# Patient Record
Sex: FEMALE | Race: WHITE | NOT HISPANIC OR LATINO | URBAN - METROPOLITAN AREA
[De-identification: names, ages, dates, MRNs, and addresses within clinical notes are randomized per-mention and may not be internally consistent; named-entity substitution may affect disease eponyms.]

---

## 2018-06-06 ENCOUNTER — HISTORICAL (OUTPATIENT)
Dept: RADIOLOGY | Facility: HOSPITAL | Age: 43
End: 2018-06-06

## 2023-11-07 DIAGNOSIS — D72.829 LEUKOCYTOSIS: Primary | ICD-10-CM

## 2023-12-12 ENCOUNTER — OFFICE VISIT (OUTPATIENT)
Dept: HEMATOLOGY/ONCOLOGY | Facility: CLINIC | Age: 48
End: 2023-12-12
Payer: MEDICARE

## 2023-12-12 VITALS
RESPIRATION RATE: 20 BRPM | SYSTOLIC BLOOD PRESSURE: 109 MMHG | HEIGHT: 65 IN | BODY MASS INDEX: 35.82 KG/M2 | TEMPERATURE: 98 F | OXYGEN SATURATION: 99 % | WEIGHT: 215 LBS | HEART RATE: 104 BPM | DIASTOLIC BLOOD PRESSURE: 76 MMHG

## 2023-12-12 DIAGNOSIS — D72.823 LEUKEMOID REACTION: ICD-10-CM

## 2023-12-12 DIAGNOSIS — D50.0 ANEMIA DUE TO CHRONIC BLOOD LOSS: Primary | ICD-10-CM

## 2023-12-12 DIAGNOSIS — D50.0 IRON DEFICIENCY ANEMIA DUE TO CHRONIC BLOOD LOSS: ICD-10-CM

## 2023-12-12 PROBLEM — D50.9 IRON DEFICIENCY ANEMIA: Status: ACTIVE | Noted: 2023-12-12

## 2023-12-12 PROBLEM — D72.829 LEUKOCYTOSIS: Status: ACTIVE | Noted: 2023-12-12

## 2023-12-12 PROCEDURE — 99205 PR OFFICE/OUTPT VISIT, NEW, LEVL V, 60-74 MIN: ICD-10-PCS | Mod: ,,, | Performed by: NURSE PRACTITIONER

## 2023-12-12 PROCEDURE — 99205 OFFICE O/P NEW HI 60 MIN: CPT | Mod: ,,, | Performed by: NURSE PRACTITIONER

## 2023-12-12 RX ORDER — OXCARBAZEPINE 150 MG/1
150 TABLET, FILM COATED ORAL DAILY
COMMUNITY

## 2023-12-12 RX ORDER — METOPROLOL SUCCINATE 25 MG/1
25 TABLET, EXTENDED RELEASE ORAL DAILY
COMMUNITY

## 2023-12-12 RX ORDER — FERROUS SULFATE 325(65) MG
325 TABLET ORAL DAILY
COMMUNITY

## 2023-12-12 RX ORDER — LANOLIN ALCOHOL/MO/W.PET/CERES
1 CREAM (GRAM) TOPICAL 2 TIMES DAILY
COMMUNITY

## 2023-12-12 RX ORDER — QUETIAPINE FUMARATE 150 MG/1
150 TABLET, FILM COATED ORAL DAILY
COMMUNITY

## 2023-12-12 RX ORDER — DOCUSATE SODIUM 100 MG/1
100 CAPSULE, LIQUID FILLED ORAL 2 TIMES DAILY PRN
COMMUNITY

## 2023-12-12 RX ORDER — DULOXETIN HYDROCHLORIDE 60 MG/1
60 CAPSULE, DELAYED RELEASE ORAL 2 TIMES DAILY
COMMUNITY

## 2023-12-12 RX ORDER — POLYETHYLENE GLYCOL 3350 17 G/17G
17 POWDER, FOR SOLUTION ORAL
COMMUNITY

## 2023-12-12 RX ORDER — LEVONORGESTREL AND ETHINYL ESTRADIOL 6-5-10
1 KIT ORAL DAILY
COMMUNITY
Start: 2023-09-28

## 2023-12-12 RX ORDER — ATORVASTATIN CALCIUM 20 MG/1
20 TABLET, FILM COATED ORAL DAILY
COMMUNITY

## 2023-12-12 RX ORDER — IBUPROFEN 200 MG
600 TABLET ORAL EVERY 6 HOURS PRN
COMMUNITY

## 2023-12-12 NOTE — PROGRESS NOTES
Referring provider:    Skye Rowe.    Reason for consultation:    Leukocytosis.    Diagnosis:    Anemia with chronic blood loss from menorrhagia  Iron deficiency anemia    HPI:    Ms. Rios is a 48-year-old female who is referred to our office for evaluation of leukocytosis.  She has a history of anemia, currently on Ferrous Sulfate 325 mg PO QD, menorrhagia, depression, Bipolar disorder, cognitive deficits, low back pain, hyperlipidemia,  smokes 4 cigarettes a day, and morbid obesity.  She presented to her PCP for follow-up after her hospital admission on 10/7/2023 for weakness and vaginal bleeding x 3 weeks.  She was given 3 units of PRBCs and was seen by Dr. Gaucher GYN during her hospital admission.  Pelvic US on 10/8/2023 revealed:  Significant amount of complex fluid likely blood with debris distending the endometrial cavity.  Evaluation of CBCs: and CMPs:  2023:  WBC 12.1, Hgb 9.5, Hct 30.7, MCV 92, RDW 15.7, Plt 399,000, ANC 8.3 and CMP WNL.   CBC on 10/10/2023:  WBC 12.9, Hgb 11.3, hct 37.4, MCV 94, MCHC 30.2, RDW 16.3, Plts 455,000, ANC 9.8, CMP WNL.   CBC 2023:  CBC 10/7/2022:  WBC 7.3, Hgb 11.6, hct 37.9, MCV 91, MCHC 30.6, RDW 15.6, Plts 325,000, ANC 4.4, CMP WNL.    Family history:  Father -  from lung cancer.    Interval history:    2023:  Ms. Rios is here today for her consultation regarding leukocytosis.  She reports feeling well today.  She denies any fever, chills, drenching night sweats, or weight loss.  She denies any cough, SOB, chest pain, confirms severe menorrhagia, denies any epistaxis, hemoptysis, hematochezia, melena, hematuria, no abdominal pain, confirms constipation, no change in bladder habits, no joint/bone/muscle or leg cramping, no lower extremity pain or swelling, and no neurological changes.  Denies any recent infections, autoimmune disorders, splenectomy, family history of elevated WBC or anemia.  She confirms history of anemia and takes  Ferrous Sulfate 325 mg PO QD without nausea and bloating.  She is scheduled to see Dr. Alicea, GYN in February 2024 for menorrhagia.  She has never had a colonoscopy.  She reports eating and drinking well.  No recent Covid-19, infections, or illnesses.      Social History     Socioeconomic History    Marital status: Single   Tobacco Use    Smoking status: Every Day     Current packs/day: 0.25     Types: Cigarettes    Smokeless tobacco: Never   Substance and Sexual Activity    Alcohol use: Never    Drug use: Never    Sexual activity: Yes       Current Outpatient Medications   Medication Sig Dispense Refill    atorvastatin (LIPITOR) 20 MG tablet Take 20 mg by mouth once daily.      calcium citrate-vitamin D3 315-200 mg (CITRACAL+D) 315 mg-5 mcg (200 unit) per tablet Take 1 tablet by mouth 2 (two) times daily.      docusate sodium (COLACE) 100 MG capsule Take 100 mg by mouth 2 (two) times daily as needed.      DULoxetine (CYMBALTA) 60 MG capsule Take 60 mg by mouth 2 (two) times daily.      ferrous sulfate (FEOSOL) 325 mg (65 mg iron) Tab tablet Take 325 mg by mouth once daily.      ibuprofen (ADVIL,MOTRIN) 200 MG tablet Take 600 mg by mouth every 6 (six) hours as needed for Pain.      methylcellulose, laxative, 500 mg Tab Take 1 tablet by mouth Daily.      metoprolol succinate (TOPROL-XL) 25 MG 24 hr tablet Take 25 mg by mouth once daily.      multivitamin-iron-folic acid Tab Take 1 tablet by mouth Daily.      polyethylene glycol (GLYCOLAX) 17 gram PwPk Take 17 g by mouth as needed.      QUEtiapine 150 mg Tab Take 150 mg by mouth Daily.      TRILEPTAL 150 mg Tab Take 150 mg by mouth once daily.      TRIVORA, 28, 50-30 (6)/75-40 (5)/125-30(10) per tablet Take 1 tablet by mouth once daily.       No current facility-administered medications for this visit.       Review of patient's allergies indicates:   Allergen Reactions    Lortab [hydrocodone-acetaminophen] Nausea Only         Review of systems  CONSTITUTIONAL: no  fevers, no chills, no weight loss, no fatigue, no weakness  HEMATOLOGIC: no abnormal bleeding, no abnormal bruising, no drenching night sweats  ONCOLOGIC: no new masses or lumps  HEENT: no vision loss, no tinnitus or hearing loss, no nose bleeding, no dysphagia, no odynophagia  CVS: no chest pain, no palpitations, no dyspnea on exertion  RESP: no shortness of breath, no hemoptysis, no cough  GI: no nausea, no vomiting, no diarrhea, + constipation, no melena, no hematochezia, no hematemesis, no abdominal pain, no increase in abdominal girth  : no dysuria, no hematuria, no hesitancy, no discharge  INTEGUMENT: no rashes, no abnormal bruising, no nail pitting, no hyperpigmentation  NEURO: no falls, no memory loss, no paresthesias or dysesthesias, no urofecal incontinence or retention, no loss of strength on any extremity  MSK: no back pain, no new joint pain, no joint swelling  PSYCH: no suicidal or homicidal ideation, no depression, no insomnia, no anhedonia  ENDOCRINE: no heat or cold intolerance, no polyuria, no polydipsia  GYN:  Menorrhagia    Physical Exam:  Vitals:    12/12/23 1026   BP: 109/76   Pulse: 104   Resp: 20   Temp: 98.1 °F (36.7 °C)       ECOG PS 0  GA: AAOx3, NAD  HEENT: NCAT, PERRLA, EOMI, good dentition, moist oral mucous membranes  LYMPH: no cervical, axillary or supraclavicular adenopathy  CVS: s1s2 RRR, no M/R/G  RESP: CTA b/l, no crackles, no wheezes or rhonchi  ABD: soft, NT, ND, BS+, no hepatosplenomegaly  EXT: no deformities, no pedal edema  SKIN: no rashes, warm and dry  NEURO: normal mentation, strength 5/5 on all 4 extremities, no sensory deficits        Assessment  12/12/2023:  Patient with history of severe menorrhagia and iron deficiency anemia.  Recently hospitalized for weakness and 3 weeks of heavy menstrual bleeding.  Pelvic US positive for blood and debris in the endometrial cavity.  Takes daily iron without side effects.  Has an appointment with Dr. Alicea, GYN in February.  WBC  normal over the last several years.  She is probably experiencing reactive leukocytosis from heavy menstrual bleeding.    We discussed sending her for labs today to include CBC with differential, CMP, and iron panel based on the history of severe menorrhagia and recent hospitalization for weakness and anemia.  We discussed possibly moving up her GYN appointment with Dr. Alicea from February to December or January.  We discussed continuing with oral iron daily on empty stomach with a source of Vitamin C, and based on iron panel today, we may need to replete her iron with IV iron infusion.         Plan   Will send for labs today:  CBC with diff, CMP, and iron panel.  Will call Dr. Alicea's, GYN, office for possible earlier appointment.  Will see her back in 3 weeks to follow up with lab results and GYN appointment.  Continue with Ferrous Sulfate 325 mg PO QD on empty stomach with a good source of Vitamin C.      A total of  60 minutes were spent in review of records, interpretation of test, coordination of care, discussion and counseling with the patient.

## 2024-03-19 ENCOUNTER — OFFICE VISIT (OUTPATIENT)
Dept: HEMATOLOGY/ONCOLOGY | Facility: CLINIC | Age: 49
End: 2024-03-19
Payer: MEDICARE

## 2024-03-19 VITALS
RESPIRATION RATE: 18 BRPM | DIASTOLIC BLOOD PRESSURE: 88 MMHG | SYSTOLIC BLOOD PRESSURE: 132 MMHG | TEMPERATURE: 98 F | HEART RATE: 99 BPM | OXYGEN SATURATION: 98 % | HEIGHT: 65 IN | BODY MASS INDEX: 36.55 KG/M2 | WEIGHT: 219.38 LBS

## 2024-03-19 DIAGNOSIS — D72.828 OTHER ELEVATED WHITE BLOOD CELL (WBC) COUNT: ICD-10-CM

## 2024-03-19 DIAGNOSIS — D50.0 ANEMIA DUE TO CHRONIC BLOOD LOSS: Primary | ICD-10-CM

## 2024-03-19 DIAGNOSIS — D50.0 IRON DEFICIENCY ANEMIA DUE TO CHRONIC BLOOD LOSS: ICD-10-CM

## 2024-03-19 PROCEDURE — 99214 OFFICE O/P EST MOD 30 MIN: CPT | Mod: ,,, | Performed by: NURSE PRACTITIONER

## 2024-03-19 NOTE — PROGRESS NOTES
Referring provider:    Skye Rowe.    Reason for consultation:    Leukocytosis.    Diagnosis:    Anemia with chronic blood loss from menorrhagia  Iron deficiency anemia    HPI:    Ms. Rios is a 48-year-old female who is referred to our office for evaluation of leukocytosis.  She has a history of anemia, currently on Ferrous Sulfate 325 mg PO QD, menorrhagia, depression, Bipolar disorder, cognitive deficits, low back pain, hyperlipidemia,  smokes 4 cigarettes a day, and morbid obesity.  She presented to her PCP for follow-up after her hospital admission on 10/7/2023 for weakness and vaginal bleeding x 3 weeks.  She was given 3 units of PRBCs and was seen by Dr. Gaucher GYN during her hospital admission.  Pelvic US on 10/8/2023 revealed:  Significant amount of complex fluid likely blood with debris distending the endometrial cavity.  Evaluation of CBCs: and CMPs:  11/1/2023:  WBC 12.1, Hgb 9.5, Hct 30.7, MCV 92, RDW 15.7, Plt 399,000, ANC 8.3 and CMP WNL.   CBC on 10/10/2023:  WBC 12.9, Hgb 11.3, hct 37.4, MCV 94, MCHC 30.2, RDW 16.3, Plts 455,000, ANC 9.8, CMP WNL.   CBC 05/06/2023:  CBC 10/7/2022:  WBC 7.3, Hgb 11.6, hct 37.9, MCV 91, MCHC 30.6, RDW 15.6, Plts 325,000, ANC 4.4, CMP WNL.    12/12/2023:  Ms. Rios is here today for her consultation regarding leukocytosis.  She reports feeling well today.  She denies any fever, chills, drenching night sweats, or weight loss.  She denies any cough, SOB, chest pain, confirms severe menorrhagia, denies any epistaxis, hemoptysis, hematochezia, melena, hematuria, no abdominal pain, confirms constipation, no change in bladder habits, no joint/bone/muscle or leg cramping, no lower extremity pain or swelling, and no neurological changes.  Denies any recent infections, autoimmune disorders, splenectomy, family history of elevated WBC or anemia.  She confirms history of anemia and takes Ferrous Sulfate 325 mg PO QD without nausea and bloating.  She is scheduled to see  Dr. Alicea, GYN in 2024 for menorrhagia.  She has never had a colonoscopy.  She reports eating and drinking well.  No recent Covid-19, infections, or illnesses.    Interval history:    3/19/2024:  Ms. Rios is here today for discussion of lab results from her initial consultation and follow-up CBC regarding leukocytosis and iron deficiency anemia.  She reports feeling well today.  She denies any fever, chills, drenching night sweats, or weight loss.  She confirms dizziness and fatigue, but denies any cough, SOB, chest pain, confirms severe menorrhagia, denies any epistaxis, hemoptysis, hematochezia, melena, hematuria, no abdominal pain, confirms constipation, no change in bladder habits, no joint/bone/muscle or leg cramping, no lower extremity pain or swelling, and no neurological changes.  She continues Ferrous Sulfate 325 mg PO QD.  She is scheduled to see Dr. Alicea, GYN 2024 for menorrhagia.  Labs dated 2023:  Creatinine 0.87,  Liver enzymes WNL, T. Bili WNL, iron saturation 6%, ferritin 27, WBC 11.54, Hgb 9.9, Nct 33.2, and Plt 489,000, Retic 3.80.  Labs dated 03/15/2024:  Creatinine 0.68,  liver enzymes WNL, T. Bili WNL, iron saturation 8, ferritin 26, WBC 9.28, Hgb 11.2, Hct 35.6, Plt 435,000, Retic 1.80.  She reports eating and drinking well.  No recent Covid-19, infections, or illnesses.    Family history:  Father -  from lung cancer.      Social History     Socioeconomic History    Marital status: Single   Tobacco Use    Smoking status: Every Day     Current packs/day: 0.25     Types: Cigarettes    Smokeless tobacco: Never   Substance and Sexual Activity    Alcohol use: Never    Drug use: Never    Sexual activity: Yes       Current Outpatient Medications   Medication Sig Dispense Refill    atorvastatin (LIPITOR) 20 MG tablet Take 20 mg by mouth once daily.      calcium citrate-vitamin D3 315-200 mg (CITRACAL+D) 315 mg-5 mcg (200 unit) per tablet Take 1 tablet by mouth 2 (two)  times daily.      docusate sodium (COLACE) 100 MG capsule Take 100 mg by mouth 2 (two) times daily as needed.      DULoxetine (CYMBALTA) 60 MG capsule Take 60 mg by mouth 2 (two) times daily.      ferrous sulfate (FEOSOL) 325 mg (65 mg iron) Tab tablet Take 325 mg by mouth once daily.      metoprolol succinate (TOPROL-XL) 25 MG 24 hr tablet Take 25 mg by mouth once daily.      multivitamin-iron-folic acid Tab Take 1 tablet by mouth Daily.      polyethylene glycol (GLYCOLAX) 17 gram PwPk Take 17 g by mouth as needed.      QUEtiapine 150 mg Tab Take 150 mg by mouth Daily.      TRILEPTAL 150 mg Tab Take 150 mg by mouth once daily.      TRIVORA, 28, 50-30 (6)/75-40 (5)/125-30(10) per tablet Take 1 tablet by mouth once daily.      ibuprofen (ADVIL,MOTRIN) 200 MG tablet Take 600 mg by mouth every 6 (six) hours as needed for Pain.      methylcellulose, laxative, 500 mg Tab Take 1 tablet by mouth Daily.       No current facility-administered medications for this visit.       Review of patient's allergies indicates:   Allergen Reactions    Lortab [hydrocodone-acetaminophen] Nausea Only     Labs:  Labs dated 03/15/2024:  Creatinine 0.68,  liver enzymes WNL, T. Bili WNL, iron saturation 8, ferritin 26, WBC 9.28, Hgb 11.2, Hct 35.6, Plt 435,000, Retic 1.80.  12/12/2023:  Creatinine 0.87,  Liver enzymes WNL, T. Bili WNL, iron saturation 6%, ferritin 27, WBC 11.54, Hgb 9.9, Nct 33.2, and Plt 489,000, Retic 3.80.     Review of systems  CONSTITUTIONAL: no fevers, no chills, no weight loss, no fatigue, no weakness  HEMATOLOGIC: no abnormal bleeding, no abnormal bruising, no drenching night sweats  ONCOLOGIC: no new masses or lumps  HEENT: no vision loss, no tinnitus or hearing loss, no nose bleeding, no dysphagia, no odynophagia  CVS: no chest pain, no palpitations, no dyspnea on exertion  RESP: no shortness of breath, no hemoptysis, no cough  GI: no nausea, no vomiting, no diarrhea, + constipation, no melena, no hematochezia, no  hematemesis, no abdominal pain, no increase in abdominal girth  : no dysuria, no hematuria, no hesitancy, no discharge  INTEGUMENT: no rashes, no abnormal bruising, no nail pitting, no hyperpigmentation  NEURO: no falls, no memory loss, no paresthesias or dysesthesias, no urofecal incontinence or retention, no loss of strength on any extremity  MSK: no back pain, no new joint pain, no joint swelling  PSYCH: no suicidal or homicidal ideation, no depression, no insomnia, no anhedonia  ENDOCRINE: no heat or cold intolerance, no polyuria, no polydipsia  GYN:  Menorrhagia    Physical Exam:  Vitals:    03/19/24 1424   BP: 132/88   Pulse: 99   Resp: 18   Temp: 98.2 °F (36.8 °C)         ECOG PS 0  GA: AAOx3, NAD  HEENT: NCAT, PERRLA, EOMI, good dentition, moist oral mucous membranes  LYMPH: no cervical, axillary or supraclavicular adenopathy  CVS: s1s2 RRR, no M/R/G  RESP: CTA b/l, no crackles, no wheezes or rhonchi  ABD: soft, NT, ND, BS+, no hepatosplenomegaly  EXT: no deformities, no pedal edema  SKIN: no rashes, warm and dry  NEURO: normal mentation, strength 5/5 on all 4 extremities, no sensory deficits        Assessment    Iron deficiency anemia secondary to menorrhagia D 50.0    Patient with history of severe menorrhagia and iron deficiency anemia.  Recently hospitalized for weakness and 3 weeks of heavy menstrual bleeding.  Pelvic US positive for blood and debris in the endometrial cavity.  Takes daily iron without side effects.  Has an appointment with Dr. Alicea, GYN in February.  WBC normal over the last several years.  We discussed sending her for labs today to include CBC with differential, CMP, and iron panel based on the history of severe menorrhagia and recent hospitalization for weakness and anemia.  We discussed possibly moving up her GYN appointment with Dr. Alicea from February to December or January.  We discussed continuing with oral iron daily on empty stomach with a source of Vitamin C, and based on  iron panel today, we may need to replete her iron with IV iron infusion.   12/12/2023:  Iron saturation 6%, ferritin 27, WBC 11.54, Hgb 9.9, Hct 33.2, Retic 3.80.  Labs dated 03/15/2024:  Iron saturation 8, ferritin 26, WBC 9.28, Hgb 11.2, Hct 35.6, Retic 1.80    Leukocytosis reactive D72.828    She is probably experiencing reactive leukocytosis from heavy menstrual bleeding.  Resolved 3/15/2024.           Plan   Venofer IV infusion ordered once weekly x 5 weeks.  Dr. Alicea GYN appointment, March 27,2024 for menorrhagia management.  Will see her back in 12 weeks with CMP, CBC, and iron panel.  Will see Dr. Malhotra at this visit.  Continue with Ferrous Sulfate 325 mg PO QD on empty stomach with a good source of Vitamin C.    The patient is in agreement with today's plan of care.  All questions answered.  Patient is aware to contact our office for any problems or concerns prior to next visit.      Shawnee Solo, MSN-ED, APRN, AGACNP-BC, OCN

## 2024-06-12 ENCOUNTER — OFFICE VISIT (OUTPATIENT)
Dept: HEMATOLOGY/ONCOLOGY | Facility: CLINIC | Age: 49
End: 2024-06-12
Payer: MEDICARE

## 2024-06-12 VITALS
RESPIRATION RATE: 18 BRPM | HEIGHT: 65 IN | SYSTOLIC BLOOD PRESSURE: 124 MMHG | HEART RATE: 103 BPM | OXYGEN SATURATION: 98 % | WEIGHT: 230.5 LBS | BODY MASS INDEX: 38.4 KG/M2 | DIASTOLIC BLOOD PRESSURE: 86 MMHG | TEMPERATURE: 98 F

## 2024-06-12 DIAGNOSIS — D50.0 IRON DEFICIENCY ANEMIA DUE TO CHRONIC BLOOD LOSS: Primary | ICD-10-CM

## 2024-06-12 PROCEDURE — 99214 OFFICE O/P EST MOD 30 MIN: CPT | Mod: ,,,

## 2024-06-12 NOTE — PROGRESS NOTES
Referring provider:    Skye Rowe.    Reason for consultation:    Leukocytosis.    Diagnosis:    Anemia with chronic blood loss from menorrhagia  Iron deficiency anemia    HPI:    Ms. Rios is a 48-year-old female who is referred to our office for evaluation of leukocytosis.  She has a history of anemia, currently on Ferrous Sulfate 325 mg PO QD, menorrhagia, depression, Bipolar disorder, cognitive deficits, low back pain, hyperlipidemia,  smokes 4 cigarettes a day, and morbid obesity.  She presented to her PCP for follow-up after her hospital admission on 10/7/2023 for weakness and vaginal bleeding x 3 weeks.  She was given 3 units of PRBCs and was seen by Dr. Gaucher GYN during her hospital admission.  Pelvic US on 10/8/2023 revealed:  Significant amount of complex fluid likely blood with debris distending the endometrial cavity.  Evaluation of CBCs: and CMPs:  11/1/2023:  WBC 12.1, Hgb 9.5, Hct 30.7, MCV 92, RDW 15.7, Plt 399,000, ANC 8.3 and CMP WNL.   CBC on 10/10/2023:  WBC 12.9, Hgb 11.3, hct 37.4, MCV 94, MCHC 30.2, RDW 16.3, Plts 455,000, ANC 9.8, CMP WNL.   CBC 05/06/2023:  CBC 10/7/2022:  WBC 7.3, Hgb 11.6, hct 37.9, MCV 91, MCHC 30.6, RDW 15.6, Plts 325,000, ANC 4.4, CMP WNL.    12/12/2023:  Ms. Rios is here today for her consultation regarding leukocytosis.  She reports feeling well today.  She denies any fever, chills, drenching night sweats, or weight loss.  She denies any cough, SOB, chest pain, confirms severe menorrhagia, denies any epistaxis, hemoptysis, hematochezia, melena, hematuria, no abdominal pain, confirms constipation, no change in bladder habits, no joint/bone/muscle or leg cramping, no lower extremity pain or swelling, and no neurological changes.  Denies any recent infections, autoimmune disorders, splenectomy, family history of elevated WBC or anemia.  She confirms history of anemia and takes Ferrous Sulfate 325 mg PO QD without nausea and bloating.  She is scheduled to see  Dr. Alicea, GYN in 2024 for menorrhagia.  She has never had a colonoscopy.  She reports eating and drinking well.  No recent Covid-19, infections, or illnesses.    Interval history:    :  Ms. Rios is here today for discussion of lab results regarding leukocytosis and iron deficiency anemia.  She reports feeling well today.  She denies any fever, chills, drenching night sweats, or weight loss.  She confirms dizziness and fatigue, but denies any cough, SOB, chest pain, confirms severe menorrhagia, denies any epistaxis, hemoptysis, hematochezia, melena, hematuria, no abdominal pain, confirms constipation, no change in bladder habits, no joint/bone/muscle or leg cramping, no lower extremity pain or swelling, and no neurological changes.  She continues Ferrous Sulfate 325 mg PO QD.  She continues to follow-up with see Dr. Alieca, GYN for menorrhagia.  She reports eating and drinking well.      Family history:  Father -  from lung cancer.      Social History     Socioeconomic History    Marital status: Single   Tobacco Use    Smoking status: Every Day     Current packs/day: 0.25     Types: Cigarettes    Smokeless tobacco: Never   Substance and Sexual Activity    Alcohol use: Never    Drug use: Never    Sexual activity: Yes       Current Outpatient Medications   Medication Sig Dispense Refill    atorvastatin (LIPITOR) 20 MG tablet Take 20 mg by mouth once daily.      calcium citrate-vitamin D3 315-200 mg (CITRACAL+D) 315 mg-5 mcg (200 unit) per tablet Take 1 tablet by mouth 2 (two) times daily.      docusate sodium (COLACE) 100 MG capsule Take 100 mg by mouth 2 (two) times daily as needed.      DULoxetine (CYMBALTA) 60 MG capsule Take 60 mg by mouth 2 (two) times daily.      ferrous sulfate (FEOSOL) 325 mg (65 mg iron) Tab tablet Take 325 mg by mouth once daily.      ibuprofen (ADVIL,MOTRIN) 200 MG tablet Take 600 mg by mouth every 6 (six) hours as needed for Pain.      methylcellulose,  laxative, 500 mg Tab Take 1 tablet by mouth Daily.      metoprolol succinate (TOPROL-XL) 25 MG 24 hr tablet Take 25 mg by mouth once daily.      multivitamin-iron-folic acid Tab Take 1 tablet by mouth Daily.      polyethylene glycol (GLYCOLAX) 17 gram PwPk Take 17 g by mouth as needed.      QUEtiapine 150 mg Tab Take 150 mg by mouth Daily.      TRILEPTAL 150 mg Tab Take 150 mg by mouth once daily.      TRIVORA, 28, 50-30 (6)/75-40 (5)/125-30(10) per tablet Take 1 tablet by mouth once daily.       No current facility-administered medications for this visit.       Review of patient's allergies indicates:   Allergen Reactions    Lortab [hydrocodone-acetaminophen] Nausea Only     Labs:  06/03/24: cr 0.81, iron 60, iron sat 19%, ferritin 159, wbc 9.49, hgb 11.9, plt 388, ANC 6.91  03/15/2024:  Creatinine 0.68,  liver enzymes WNL, T. Bili WNL, iron saturation 8, ferritin 26, WBC 9.28, Hgb 11.2, Hct 35.6, Plt 435,000, Retic 1.80.  12/12/2023:  Creatinine 0.87,  Liver enzymes WNL, T. Bili WNL, iron saturation 6%, ferritin 27, WBC 11.54, Hgb 9.9, Nct 33.2, and Plt 489,000, Retic 3.80.     Review of systems  CONSTITUTIONAL: no fevers, no chills, no weight loss, no fatigue, no weakness  HEMATOLOGIC: no abnormal bleeding, no abnormal bruising, no drenching night sweats  ONCOLOGIC: no new masses or lumps  HEENT: no vision loss, no tinnitus or hearing loss, no nose bleeding, no dysphagia, no odynophagia  CVS: no chest pain, no palpitations, no dyspnea on exertion  RESP: no shortness of breath, no hemoptysis, no cough  GI: no nausea, no vomiting, no diarrhea, + constipation, no melena, no hematochezia, no hematemesis, no abdominal pain, no increase in abdominal girth  : no dysuria, no hematuria, no hesitancy, no discharge  INTEGUMENT: no rashes, no abnormal bruising, no nail pitting, no hyperpigmentation  NEURO: no falls, no memory loss, no paresthesias or dysesthesias, no urofecal incontinence or retention, no loss of strength  on any extremity  MSK: no back pain, no new joint pain, no joint swelling  PSYCH: no suicidal or homicidal ideation, no depression, no insomnia, no anhedonia  ENDOCRINE: no heat or cold intolerance, no polyuria, no polydipsia  GYN:  Menorrhagia    Physical Exam:  Vitals:    06/12/24 1345   BP: 124/86   Pulse: 103   Resp: 18   Temp: 98.3 °F (36.8 °C)         ECOG PS 0  GA: AAOx3, NAD  HEENT: NCAT, PERRLA, EOMI, good dentition, moist oral mucous membranes  LYMPH: no cervical, axillary or supraclavicular adenopathy  CVS: s1s2 RRR, no M/R/G  RESP: CTA b/l, no crackles, no wheezes or rhonchi  ABD: soft, NT, ND, BS+, no hepatosplenomegaly  EXT: no deformities, no pedal edema  SKIN: no rashes, warm and dry  NEURO: normal mentation, strength 5/5 on all 4 extremities, no sensory deficits        Assessment    Iron deficiency anemia secondary to menorrhagia D 50.0    Patient with history of severe menorrhagia and iron deficiency anemia.  Recently hospitalized for weakness and 3 weeks of heavy menstrual bleeding.  Pelvic US positive for blood and debris in the endometrial cavity.  Takes daily iron without side effects.  Has an appointment with Dr. Alicea, GYN in February.  WBC normal over the last several years.  We discussed sending her for labs today to include CBC with differential, CMP, and iron panel based on the history of severe menorrhagia and recent hospitalization for weakness and anemia.  We discussed possibly moving up her GYN appointment with Dr. Alicea from February to December or January.  We discussed continuing with oral iron daily on empty stomach with a source of Vitamin C, and based on iron panel today, we may need to replete her iron with IV iron infusion.   12/12/2023:  Iron saturation 6%, ferritin 27, WBC 11.54, Hgb 9.9, Hct 33.2, Retic 3.80.  Labs dated 03/15/2024:  Iron saturation 8, ferritin 26, WBC 9.28, Hgb 11.2, Hct 35.6, Retic 1.80    Leukocytosis reactive D72.828    She is probably experiencing  reactive leukocytosis from heavy menstrual bleeding.  Resolved 3/15/2024.           Plan   Labs reviewed, patient s/p Venofer from 4/2024. Iron levels normal  Continue follow-up with Dr. Alicea GYN for menorrhagia management.  RTC 4 months CMP, CBC, and iron panel.    Continue with Ferrous Sulfate 325 mg PO QD on empty stomach with a good source of Vitamin C.    The patient is in agreement with today's plan of care.  All questions answered.  Patient is aware to contact our office for any problems or concerns prior to next visit.

## 2024-10-21 ENCOUNTER — OFFICE VISIT (OUTPATIENT)
Dept: HEMATOLOGY/ONCOLOGY | Facility: CLINIC | Age: 49
End: 2024-10-21
Payer: MEDICARE

## 2024-10-21 VITALS
OXYGEN SATURATION: 98 % | WEIGHT: 225.69 LBS | BODY MASS INDEX: 38.53 KG/M2 | RESPIRATION RATE: 20 BRPM | HEIGHT: 64 IN | SYSTOLIC BLOOD PRESSURE: 146 MMHG | TEMPERATURE: 98 F | DIASTOLIC BLOOD PRESSURE: 87 MMHG | HEART RATE: 107 BPM

## 2024-10-21 DIAGNOSIS — D50.0 IRON DEFICIENCY ANEMIA DUE TO CHRONIC BLOOD LOSS: Primary | ICD-10-CM

## 2024-10-21 DIAGNOSIS — D72.828 OTHER ELEVATED WHITE BLOOD CELL (WBC) COUNT: ICD-10-CM

## 2024-10-21 DIAGNOSIS — D72.823 LEUKEMOID REACTION: ICD-10-CM

## 2024-10-21 DIAGNOSIS — D50.0 ANEMIA DUE TO CHRONIC BLOOD LOSS: ICD-10-CM

## 2024-10-21 PROCEDURE — 99213 OFFICE O/P EST LOW 20 MIN: CPT | Mod: ,,,

## 2024-10-21 RX ORDER — FERROUS SULFATE 325(65) MG
325 TABLET ORAL DAILY
Qty: 30 TABLET | Refills: 4 | Status: SHIPPED | OUTPATIENT
Start: 2024-10-21

## 2024-10-21 NOTE — PROGRESS NOTES
Referring provider:    Skye Rowe.    Reason for consultation:    Leukocytosis.    Diagnosis:    Anemia with chronic blood loss from menorrhagia  Iron deficiency anemia    HPI:    Ms. Rios is a 48-year-old female who is referred to our office for evaluation of leukocytosis.  She has a history of anemia, currently on Ferrous Sulfate 325 mg PO QD, menorrhagia, depression, Bipolar disorder, cognitive deficits, low back pain, hyperlipidemia,  smokes 4 cigarettes a day, and morbid obesity.  She presented to her PCP for follow-up after her hospital admission on 10/7/2023 for weakness and vaginal bleeding x 3 weeks.  She was given 3 units of PRBCs and was seen by Dr. Gaucher GYN during her hospital admission.  Pelvic US on 10/8/2023 revealed:  Significant amount of complex fluid likely blood with debris distending the endometrial cavity.  Evaluation of CBCs: and CMPs:  11/1/2023:  WBC 12.1, Hgb 9.5, Hct 30.7, MCV 92, RDW 15.7, Plt 399,000, ANC 8.3 and CMP WNL.   CBC on 10/10/2023:  WBC 12.9, Hgb 11.3, hct 37.4, MCV 94, MCHC 30.2, RDW 16.3, Plts 455,000, ANC 9.8, CMP WNL.   CBC 05/06/2023:  CBC 10/7/2022:  WBC 7.3, Hgb 11.6, hct 37.9, MCV 91, MCHC 30.6, RDW 15.6, Plts 325,000, ANC 4.4, CMP WNL.    Interval history:    06/12/2024:  Ms. Rios is here today for discussion of lab results regarding leukocytosis and iron deficiency anemia.  She reports feeling well today.  She denies any fever, chills, drenching night sweats, or weight loss.  She confirms dizziness and fatigue, but denies any cough, SOB, chest pain, confirms severe menorrhagia, denies any epistaxis, hemoptysis, hematochezia, melena, hematuria, no abdominal pain, confirms constipation, no change in bladder habits, no joint/bone/muscle or leg cramping, no lower extremity pain or swelling, and no neurological changes.She has not been compliant with oral iron, will refill today  She continues to follow-up with see Dr. Alicea, GYN for menorrhagia.  She reports  eating and drinking well.      Family history:  Father -  from lung cancer.      Social History     Socioeconomic History    Marital status: Single   Tobacco Use    Smoking status: Every Day     Current packs/day: 0.25     Types: Cigarettes    Smokeless tobacco: Never   Substance and Sexual Activity    Alcohol use: Never    Drug use: Never    Sexual activity: Yes       Current Outpatient Medications   Medication Sig Dispense Refill    atorvastatin (LIPITOR) 20 MG tablet Take 20 mg by mouth once daily.      calcium citrate-vitamin D3 315-200 mg (CITRACAL+D) 315 mg-5 mcg (200 unit) per tablet Take 1 tablet by mouth 2 (two) times daily.      docusate sodium (COLACE) 100 MG capsule Take 100 mg by mouth 2 (two) times daily as needed.      DULoxetine (CYMBALTA) 60 MG capsule Take 60 mg by mouth 2 (two) times daily.      ibuprofen (ADVIL,MOTRIN) 200 MG tablet Take 600 mg by mouth every 6 (six) hours as needed for Pain.      methylcellulose, laxative, 500 mg Tab Take 1 tablet by mouth Daily.      metoprolol succinate (TOPROL-XL) 25 MG 24 hr tablet Take 25 mg by mouth once daily.      multivitamin-iron-folic acid Tab Take 1 tablet by mouth Daily.      polyethylene glycol (GLYCOLAX) 17 gram PwPk Take 17 g by mouth as needed.      QUEtiapine 150 mg Tab Take 150 mg by mouth Daily.      TRILEPTAL 150 mg Tab Take 150 mg by mouth once daily.      TRIVORA, 28, 50-30 (6)/75-40 (5)/125-30(10) per tablet Take 1 tablet by mouth once daily.      ferrous sulfate (FEOSOL) 325 mg (65 mg iron) Tab tablet Take 1 tablet (325 mg total) by mouth once daily. 30 tablet 4     No current facility-administered medications for this visit.       Review of patient's allergies indicates:   Allergen Reactions    Lortab [hydrocodone-acetaminophen] Nausea Only     Labs:  10/9/24: cr 0.74, iron 113, iron sat 30%, ferritin 34, wbc 10.99, hgb 11.2, plt 397, ANC 7.68  24: cr 0.81, iron 60, iron sat 19%, ferritin 159, wbc 9.49, hgb 11.9, plt 388,  ANC 6.91  03/15/2024:  Creatinine 0.68,  liver enzymes WNL, T. Bili WNL, iron saturation 8, ferritin 26, WBC 9.28, Hgb 11.2, Hct 35.6, Plt 435,000, Retic 1.80.  12/12/2023:  Creatinine 0.87,  Liver enzymes WNL, T. Bili WNL, iron saturation 6%, ferritin 27, WBC 11.54, Hgb 9.9, Nct 33.2, and Plt 489,000, Retic 3.80.     Review of systems  CONSTITUTIONAL: no fevers, no chills, no weight loss, no fatigue, no weakness  HEMATOLOGIC: no abnormal bleeding, no abnormal bruising, no drenching night sweats  ONCOLOGIC: no new masses or lumps  HEENT: no vision loss, no tinnitus or hearing loss, no nose bleeding, no dysphagia, no odynophagia  CVS: no chest pain, no palpitations, no dyspnea on exertion  RESP: no shortness of breath, no hemoptysis, no cough  GI: no nausea, no vomiting, no diarrhea, + constipation, no melena, no hematochezia, no hematemesis, no abdominal pain, no increase in abdominal girth  : no dysuria, no hematuria, no hesitancy, no discharge  INTEGUMENT: no rashes, no abnormal bruising, no nail pitting, no hyperpigmentation  NEURO: no falls, no memory loss, no paresthesias or dysesthesias, no urofecal incontinence or retention, no loss of strength on any extremity  MSK: no back pain, no new joint pain, no joint swelling  PSYCH: no suicidal or homicidal ideation, no depression, no insomnia, no anhedonia  ENDOCRINE: no heat or cold intolerance, no polyuria, no polydipsia  GYN:  Menorrhagia    Physical Exam:  Vitals:    10/21/24 1508   BP: (!) 146/87   Pulse: 107   Resp: 20   Temp: 98 °F (36.7 °C)           ECOG PS 0  GA: AAOx3, NAD  HEENT: NCAT, PERRLA, EOMI, good dentition, moist oral mucous membranes  LYMPH: no cervical, axillary or supraclavicular adenopathy  CVS: s1s2 RRR, no M/R/G  RESP: CTA b/l, no crackles, no wheezes or rhonchi  ABD: soft, NT, ND, BS+, no hepatosplenomegaly  EXT: no deformities, no pedal edema  SKIN: no rashes, warm and dry  NEURO: normal mentation, strength 5/5 on all 4 extremities, no  sensory deficits        Assessment    Iron deficiency anemia secondary to menorrhagia D 50.0    Patient with history of severe menorrhagia and iron deficiency anemia.  Recently hospitalized for weakness and 3 weeks of heavy menstrual bleeding.  Pelvic US positive for blood and debris in the endometrial cavity.  Takes daily iron without side effects.  Has an appointment with Dr. Alicea, GYN in February.  WBC normal over the last several years.  We discussed sending her for labs today to include CBC with differential, CMP, and iron panel based on the history of severe menorrhagia and recent hospitalization for weakness and anemia.  We discussed possibly moving up her GYN appointment with Dr. Alicea from February to December or January.  We discussed continuing with oral iron daily on empty stomach with a source of Vitamin C, and based on iron panel today, we may need to replete her iron with IV iron infusion.   12/12/2023:  Iron saturation 6%, ferritin 27, WBC 11.54, Hgb 9.9, Hct 33.2, Retic 3.80.  Labs dated 03/15/2024:  Iron saturation 8, ferritin 26, WBC 9.28, Hgb 11.2, Hct 35.6, Retic 1.80    Leukocytosis reactive D72.828    She is probably experiencing reactive leukocytosis from heavy menstrual bleeding.  Resolved 3/15/2024.     Plan   Labs reviewed,  Iron levels low normal  Continue follow-up with Dr. Alicea GYN for menorrhagia management.  RTC 2 months CMP, CBC, and iron panel.    Restart Ferrous Sulfate 325 mg PO QD on empty stomach with a good source of Vitamin C.    The patient is in agreement with today's plan of care.  All questions answered.  Patient is aware to contact our office for any problems or concerns prior to next visit.    Odalis CALDWELL-C

## 2025-01-03 NOTE — PROGRESS NOTES
Referring provider:    Skye Rowe.    Reason for consultation:    Leukocytosis.    Diagnosis:    Anemia with chronic blood loss from menorrhagia  Iron deficiency anemia    HPI:    Ms. Rios is a 48-year-old female who is referred to our office for evaluation of leukocytosis.  She has a history of anemia, currently on Ferrous Sulfate 325 mg PO QD, menorrhagia, depression, Bipolar disorder, cognitive deficits, low back pain, hyperlipidemia,  smokes 4 cigarettes a day, and morbid obesity.  She presented to her PCP for follow-up after her hospital admission on 10/7/2023 for weakness and vaginal bleeding x 3 weeks.  She was given 3 units of PRBCs and was seen by Dr. Gaucher GYN during her hospital admission.  Pelvic US on 10/8/2023 revealed:  Significant amount of complex fluid likely blood with debris distending the endometrial cavity.  Evaluation of CBCs: and CMPs:  11/1/2023:  WBC 12.1, Hgb 9.5, Hct 30.7, MCV 92, RDW 15.7, Plt 399,000, ANC 8.3 and CMP WNL.   CBC on 10/10/2023:  WBC 12.9, Hgb 11.3, hct 37.4, MCV 94, MCHC 30.2, RDW 16.3, Plts 455,000, ANC 9.8, CMP WNL.   CBC 05/06/2023:  CBC 10/7/2022:  WBC 7.3, Hgb 11.6, hct 37.9, MCV 91, MCHC 30.6, RDW 15.6, Plts 325,000, ANC 4.4, CMP WNL.    Interval history:    1/6/2025:  Ms. Rios is here today for discussion of lab results regarding leukocytosis and iron deficiency anemia.  She reports feeling well today.  She denies any fever, chills, drenching night sweats, or weight loss.  She confirms dizziness and fatigue, but denies any cough, SOB, chest pain, confirms severe menorrhagia, denies any epistaxis, hemoptysis, hematochezia, melena, hematuria, no abdominal pain, confirms constipation, no change in bladder habits, no joint/bone/muscle or leg cramping, no lower extremity pain or swelling, and no neurological changes. She has not been compliant with oral iron, will refill today  She continues to follow-up with see Dr. Alicea, GYN for menorrhagia.  She reports  eating and drinking well.      Family history:  Father -  from lung cancer.      Social History     Socioeconomic History    Marital status: Single   Tobacco Use    Smoking status: Every Day     Current packs/day: 0.25     Types: Cigarettes    Smokeless tobacco: Never   Substance and Sexual Activity    Alcohol use: Never    Drug use: Never    Sexual activity: Yes       Current Outpatient Medications   Medication Sig Dispense Refill    atorvastatin (LIPITOR) 20 MG tablet Take 20 mg by mouth once daily.      calcium citrate-vitamin D3 315-200 mg (CITRACAL+D) 315 mg-5 mcg (200 unit) per tablet Take 1 tablet by mouth 2 (two) times daily.      docusate sodium (COLACE) 100 MG capsule Take 100 mg by mouth 2 (two) times daily as needed.      DULoxetine (CYMBALTA) 60 MG capsule Take 60 mg by mouth 2 (two) times daily.      ferrous sulfate (FEOSOL) 325 mg (65 mg iron) Tab tablet Take 1 tablet (325 mg total) by mouth once daily. 30 tablet 4    ibuprofen (ADVIL,MOTRIN) 200 MG tablet Take 600 mg by mouth every 6 (six) hours as needed for Pain.      methylcellulose, laxative, 500 mg Tab Take 1 tablet by mouth Daily.      metoprolol succinate (TOPROL-XL) 25 MG 24 hr tablet Take 25 mg by mouth once daily.      multivitamin-iron-folic acid Tab Take 1 tablet by mouth Daily.      polyethylene glycol (GLYCOLAX) 17 gram PwPk Take 17 g by mouth as needed.      QUEtiapine 150 mg Tab Take 150 mg by mouth Daily.      TRILEPTAL 150 mg Tab Take 150 mg by mouth once daily.      TRIVORA, 28, 50-30 (6)/75-40 (5)/125-30(10) per tablet Take 1 tablet by mouth once daily.       No current facility-administered medications for this visit.       Review of patient's allergies indicates:   Allergen Reactions    Lortab [hydrocodone-acetaminophen] Nausea Only     Labs:  25: cr 0.74, iron 92, iron sat 24%, ferritin 24, wbc 9.12, hgb 10.5, plt 399, ANC 5.61    10/9/24: cr 0.74, iron 113, iron sat 30%, ferritin 34, wbc 10.99, hgb 11.2, plt 397,  ANC 7.68  06/03/24: cr 0.81, iron 60, iron sat 19%, ferritin 159, wbc 9.49, hgb 11.9, plt 388, ANC 6.91  03/15/2024:  Creatinine 0.68,  liver enzymes WNL, T. Bili WNL, iron saturation 8, ferritin 26, WBC 9.28, Hgb 11.2, Hct 35.6, Plt 435,000, Retic 1.80.  12/12/2023:  Creatinine 0.87,  Liver enzymes WNL, T. Bili WNL, iron saturation 6%, ferritin 27, WBC 11.54, Hgb 9.9, Nct 33.2, and Plt 489,000, Retic 3.80.     Review of systems  CONSTITUTIONAL: no fevers, no chills, no weight loss, no fatigue, no weakness  HEMATOLOGIC: no abnormal bleeding, no abnormal bruising, no drenching night sweats  ONCOLOGIC: no new masses or lumps  HEENT: no vision loss, no tinnitus or hearing loss, no nose bleeding, no dysphagia, no odynophagia  CVS: no chest pain, no palpitations, no dyspnea on exertion  RESP: no shortness of breath, no hemoptysis, no cough  GI: no nausea, no vomiting, no diarrhea, + constipation, no melena, no hematochezia, no hematemesis, no abdominal pain, no increase in abdominal girth  : no dysuria, no hematuria, no hesitancy, no discharge  INTEGUMENT: no rashes, no abnormal bruising, no nail pitting, no hyperpigmentation  NEURO: no falls, no memory loss, no paresthesias or dysesthesias, no urofecal incontinence or retention, no loss of strength on any extremity  MSK: no back pain, no new joint pain, no joint swelling  PSYCH: no suicidal or homicidal ideation, no depression, no insomnia, no anhedonia  ENDOCRINE: no heat or cold intolerance, no polyuria, no polydipsia  GYN:  Menorrhagia    Physical Exam:  There were no vitals filed for this visit.          ECOG PS 0  GA: AAOx3, NAD  HEENT: NCAT, PERRLA, EOMI, good dentition, moist oral mucous membranes  LYMPH: no cervical, axillary or supraclavicular adenopathy  CVS: s1s2 RRR, no M/R/G  RESP: CTA b/l, no crackles, no wheezes or rhonchi  ABD: soft, NT, ND, BS+, no hepatosplenomegaly  EXT: no deformities, no pedal edema  SKIN: no rashes, warm and dry  NEURO: normal  mentation, strength 5/5 on all 4 extremities, no sensory deficits        Assessment    Iron deficiency anemia secondary to menorrhagia D 50.0    Patient with history of severe menorrhagia and iron deficiency anemia.  Recently hospitalized for weakness and 3 weeks of heavy menstrual bleeding.  Pelvic US positive for blood and debris in the endometrial cavity.  Takes daily iron without side effects.  Has an appointment with Dr. Alicea, GYN in February.  WBC normal over the last several years.  We discussed sending her for labs today to include CBC with differential, CMP, and iron panel based on the history of severe menorrhagia and recent hospitalization for weakness and anemia.  We discussed possibly moving up her GYN appointment with Dr. Alicea from February to December or January.  We discussed continuing with oral iron daily on empty stomach with a source of Vitamin C, and based on iron panel today, we may need to replete her iron with IV iron infusion.   12/12/2023:  Iron saturation 6%, ferritin 27, WBC 11.54, Hgb 9.9, Hct 33.2, Retic 3.80.  Labs dated 03/15/2024:  Iron saturation 8, ferritin 26, WBC 9.28, Hgb 11.2, Hct 35.6, Retic 1.80    Leukocytosis reactive D72.828    She is probably experiencing reactive leukocytosis from heavy menstrual bleeding.  Resolved 3/15/2024.     Plan   Labs reviewed,  Iron and WBC levels WNL  Continue follow-up with Dr. Alicea GYN for menorrhagia management.  RTC 3 months CMP, CBC, and iron panel.    Continue Ferrous Sulfate 325 mg PO QD on empty stomach with a good source of Vitamin C.      Odalis Sanz FNP-C

## 2025-01-06 ENCOUNTER — OFFICE VISIT (OUTPATIENT)
Dept: HEMATOLOGY/ONCOLOGY | Facility: CLINIC | Age: 50
End: 2025-01-06
Payer: MEDICARE

## 2025-01-06 VITALS
WEIGHT: 228.81 LBS | DIASTOLIC BLOOD PRESSURE: 84 MMHG | RESPIRATION RATE: 14 BRPM | SYSTOLIC BLOOD PRESSURE: 119 MMHG | TEMPERATURE: 98 F | BODY MASS INDEX: 39.06 KG/M2 | HEART RATE: 111 BPM | HEIGHT: 64 IN | OXYGEN SATURATION: 99 %

## 2025-01-06 DIAGNOSIS — Z86.2 HISTORY OF LEUKOCYTOSIS: ICD-10-CM

## 2025-01-06 DIAGNOSIS — D50.0 IRON DEFICIENCY ANEMIA DUE TO CHRONIC BLOOD LOSS: Primary | ICD-10-CM

## 2025-01-06 PROCEDURE — 99214 OFFICE O/P EST MOD 30 MIN: CPT | Mod: ,,,

## 2025-04-10 NOTE — PROGRESS NOTES
Referring provider:    Skye Rowe.    Reason for consultation:    Leukocytosis and Iron deficiency due to menorrhagia    Diagnosis:    Anemia with chronic blood loss from menorrhagia  Iron deficiency anemia    HPI:    Ms. Rios is a 48-year-old female who is referred to our office for evaluation of leukocytosis.  She has a history of anemia, currently on Ferrous Sulfate 325 mg PO QD, menorrhagia, depression, Bipolar disorder, cognitive deficits, low back pain, hyperlipidemia,  smokes 4 cigarettes a day, and morbid obesity.  She presented to her PCP for follow-up after her hospital admission on 10/7/2023 for weakness and vaginal bleeding x 3 weeks.  She was given 3 units of PRBCs and was seen by Dr. Gaucher GYN during her hospital admission.  Pelvic US on 10/8/2023 revealed:  Significant amount of complex fluid likely blood with debris distending the endometrial cavity.  Evaluation of CBCs: and CMPs:  11/1/2023:  WBC 12.1, Hgb 9.5, Hct 30.7, MCV 92, RDW 15.7, Plt 399,000, ANC 8.3 and CMP WNL.   CBC on 10/10/2023:  WBC 12.9, Hgb 11.3, hct 37.4, MCV 94, MCHC 30.2, RDW 16.3, Plts 455,000, ANC 9.8, CMP WNL.   CBC 05/06/2023:  CBC 10/7/2022:  WBC 7.3, Hgb 11.6, hct 37.9, MCV 91, MCHC 30.6, RDW 15.6, Plts 325,000, ANC 4.4, CMP WNL.    Interval history:    4/14/25:  Ms. Rios is here today for discussion of lab results regarding leukocytosis and iron deficiency anemia.  She reports feeling well today.  She denies any fever, chills, drenching night sweats, or weight loss.  She confirms dizziness, fatigue, cough, SOB, chest pain, confirms severe menorrhagia, denies any epistaxis, hemoptysis, hematochezia, melena, hematuria, no abdominal pain, confirms constipation, no change in bladder habits, no joint/bone/muscle or leg cramping, no lower extremity pain or swelling, and no neurological changes. She has not been compliant with oral iron, will refill today  She continues to follow-up with GYN for menorrhagia.  She  reports eating and drinking well.      Family history:  Father -  from lung cancer.      Social History     Socioeconomic History    Marital status: Single   Tobacco Use    Smoking status: Every Day     Current packs/day: 0.25     Types: Cigarettes    Smokeless tobacco: Never   Substance and Sexual Activity    Alcohol use: Never    Drug use: Never    Sexual activity: Yes       Current Outpatient Medications   Medication Sig Dispense Refill    atorvastatin (LIPITOR) 20 MG tablet Take 20 mg by mouth once daily.      calcium citrate-vitamin D3 315-200 mg (CITRACAL+D) 315 mg-5 mcg (200 unit) per tablet Take 1 tablet by mouth 2 (two) times daily.      docusate sodium (COLACE) 100 MG capsule Take 100 mg by mouth 2 (two) times daily as needed.      DULoxetine (CYMBALTA) 60 MG capsule Take 60 mg by mouth 2 (two) times daily.      ferrous sulfate (FEOSOL) 325 mg (65 mg iron) Tab tablet Take 1 tablet (325 mg total) by mouth once daily. 30 tablet 4    ibuprofen (ADVIL,MOTRIN) 200 MG tablet Take 600 mg by mouth every 6 (six) hours as needed for Pain.      methylcellulose, laxative, 500 mg Tab Take 1 tablet by mouth Daily.      metoprolol succinate (TOPROL-XL) 25 MG 24 hr tablet Take 25 mg by mouth once daily.      multivitamin-iron-folic acid Tab Take 1 tablet by mouth Daily.      polyethylene glycol (GLYCOLAX) 17 gram PwPk Take 17 g by mouth as needed.      QUEtiapine 150 mg Tab Take 150 mg by mouth Daily.      TRILEPTAL 150 mg Tab Take 150 mg by mouth once daily.      TRIVORA, 28, 50-30 (6)/75-40 (5)/125-30(10) per tablet Take 1 tablet by mouth once daily.       No current facility-administered medications for this visit.       Review of patient's allergies indicates:   Allergen Reactions    Lortab [hydrocodone-acetaminophen] Nausea Only     Labs:  25: cr 0.81, iron 85, iron sat 18%, ferritin 38, wbc 10.37, hgb 12.3, plt 407, ANC 7.18    25: cr 0.74, iron 92, iron sat 24%, ferritin 24, wbc 9.12, hgb 10.5, plt  399, ANC 5.61    10/9/24: cr 0.74, iron 113, iron sat 30%, ferritin 34, wbc 10.99, hgb 11.2, plt 397, ANC 7.68  06/03/24: cr 0.81, iron 60, iron sat 19%, ferritin 159, wbc 9.49, hgb 11.9, plt 388, ANC 6.91  03/15/2024:  Creatinine 0.68,  liver enzymes WNL, T. Bili WNL, iron saturation 8, ferritin 26, WBC 9.28, Hgb 11.2, Hct 35.6, Plt 435,000, Retic 1.80.  12/12/2023:  Creatinine 0.87,  Liver enzymes WNL, T. Bili WNL, iron saturation 6%, ferritin 27, WBC 11.54, Hgb 9.9, Nct 33.2, and Plt 489,000, Retic 3.80.     Review of systems  CONSTITUTIONAL: no fevers, no chills, no weight loss, no fatigue, no weakness  HEMATOLOGIC: no abnormal bleeding, no abnormal bruising, no drenching night sweats  ONCOLOGIC: no new masses or lumps  HEENT: no vision loss, no tinnitus or hearing loss, no nose bleeding, no dysphagia, no odynophagia  CVS: no chest pain, no palpitations, no dyspnea on exertion  RESP: no shortness of breath, no hemoptysis, no cough  GI: no nausea, no vomiting, no diarrhea, + constipation, no melena, no hematochezia, no hematemesis, no abdominal pain, no increase in abdominal girth  : no dysuria, no hematuria, no hesitancy, no discharge  INTEGUMENT: no rashes, no abnormal bruising, no nail pitting, no hyperpigmentation  NEURO: no falls, no memory loss, no paresthesias or dysesthesias, no urofecal incontinence or retention, no loss of strength on any extremity  MSK: no back pain, no new joint pain, no joint swelling  PSYCH: no suicidal or homicidal ideation, no depression, no insomnia, no anhedonia  ENDOCRINE: no heat or cold intolerance, no polyuria, no polydipsia  GYN:  Menorrhagia    Physical Exam:  There were no vitals filed for this visit.          ECOG PS 0  GA: AAOx3, NAD  HEENT: NCAT, PERRLA, EOMI, good dentition, moist oral mucous membranes  LYMPH: no cervical, axillary or supraclavicular adenopathy  CVS: s1s2 RRR, no M/R/G  RESP: CTA b/l, no crackles, no wheezes or rhonchi  ABD: soft, NT, ND, BS+, no  hepatosplenomegaly  EXT: no deformities, no pedal edema  SKIN: no rashes, warm and dry  NEURO: normal mentation, strength 5/5 on all 4 extremities, no sensory deficits        Assessment    Iron deficiency anemia secondary to menorrhagia D 50.0    Patient with history of severe menorrhagia and iron deficiency anemia.  Recently hospitalized for weakness and 3 weeks of heavy menstrual bleeding.  Pelvic US positive for blood and debris in the endometrial cavity.  Takes daily iron without side effects.  Has an appointment with Dr. Alicea, GYN in February.  WBC normal over the last several years.  We discussed sending her for labs today to include CBC with differential, CMP, and iron panel based on the history of severe menorrhagia and recent hospitalization for weakness and anemia.  We discussed possibly moving up her GYN appointment with Dr. Alicea from February to December or January.  We discussed continuing with oral iron daily on empty stomach with a source of Vitamin C, and based on iron panel today, we may need to replete her iron with IV iron infusion.   12/12/2023:  Iron saturation 6%, ferritin 27, WBC 11.54, Hgb 9.9, Hct 33.2, Retic 3.80.  Labs dated 03/15/2024:  Iron saturation 8, ferritin 26, WBC 9.28, Hgb 11.2, Hct 35.6, Retic 1.80    Leukocytosis reactive D72.828    She is probably experiencing reactive leukocytosis from heavy menstrual bleeding.  Resolved 3/15/2024.     Plan   Labs reviewed,  Iron and WBC levels stable  Continue follow-up with GYN for menorrhagia management.  RTC 3 months CMP, CBC, and iron panel.    Continue Ferrous Sulfate 325 mg PO QD on empty stomach with a good source of Vitamin C.      Odalis Sanz FNP-C

## 2025-04-14 ENCOUNTER — OFFICE VISIT (OUTPATIENT)
Dept: HEMATOLOGY/ONCOLOGY | Facility: CLINIC | Age: 50
End: 2025-04-14
Payer: MEDICARE

## 2025-04-14 VITALS
BODY MASS INDEX: 38.81 KG/M2 | SYSTOLIC BLOOD PRESSURE: 126 MMHG | OXYGEN SATURATION: 96 % | HEIGHT: 64 IN | TEMPERATURE: 98 F | HEART RATE: 90 BPM | DIASTOLIC BLOOD PRESSURE: 92 MMHG | RESPIRATION RATE: 14 BRPM | WEIGHT: 227.31 LBS

## 2025-04-14 DIAGNOSIS — D50.0 IRON DEFICIENCY ANEMIA DUE TO CHRONIC BLOOD LOSS: Primary | ICD-10-CM

## 2025-04-14 DIAGNOSIS — D72.823 LEUKEMOID REACTION: ICD-10-CM

## 2025-04-14 PROCEDURE — 99214 OFFICE O/P EST MOD 30 MIN: CPT | Mod: ,,,

## 2025-07-24 ENCOUNTER — TELEPHONE (OUTPATIENT)
Dept: HEMATOLOGY/ONCOLOGY | Facility: CLINIC | Age: 50
End: 2025-07-24
Payer: MEDICARE

## 2025-07-24 NOTE — TELEPHONE ENCOUNTER
Called patient about labs and confirmed appointment for 7/28/25. Patient confirmed appointment and stated she may be able to get labs done today.

## 2025-07-28 ENCOUNTER — OFFICE VISIT (OUTPATIENT)
Dept: HEMATOLOGY/ONCOLOGY | Facility: CLINIC | Age: 50
End: 2025-07-28
Payer: MEDICARE

## 2025-07-28 VITALS
TEMPERATURE: 98 F | OXYGEN SATURATION: 96 % | HEIGHT: 64 IN | BODY MASS INDEX: 36.79 KG/M2 | DIASTOLIC BLOOD PRESSURE: 86 MMHG | WEIGHT: 215.5 LBS | SYSTOLIC BLOOD PRESSURE: 120 MMHG | RESPIRATION RATE: 12 BRPM | HEART RATE: 100 BPM

## 2025-07-28 DIAGNOSIS — D72.823 LEUKEMOID REACTION: ICD-10-CM

## 2025-07-28 DIAGNOSIS — D72.828 OTHER ELEVATED WHITE BLOOD CELL (WBC) COUNT: ICD-10-CM

## 2025-07-28 DIAGNOSIS — D50.0 IRON DEFICIENCY ANEMIA DUE TO CHRONIC BLOOD LOSS: Primary | ICD-10-CM

## 2025-07-28 DIAGNOSIS — Z86.2 HISTORY OF LEUKOCYTOSIS: ICD-10-CM

## 2025-07-28 DIAGNOSIS — D50.0 ANEMIA DUE TO CHRONIC BLOOD LOSS: ICD-10-CM

## 2025-07-28 PROCEDURE — 99214 OFFICE O/P EST MOD 30 MIN: CPT | Mod: ,,,

## 2025-07-28 RX ORDER — FERROUS SULFATE 325(65) MG
325 TABLET ORAL DAILY
Qty: 30 TABLET | Refills: 4 | Status: SHIPPED | OUTPATIENT
Start: 2025-07-28

## 2025-07-28 NOTE — PROGRESS NOTES
Referring provider:    Skye Rowe.    Reason for consultation:    Leukocytosis and Iron deficiency due to menorrhagia    Diagnosis:    Anemia with chronic blood loss from menorrhagia  Iron deficiency anemia    HPI:    Ms. Rios is a 48-year-old female who is referred to our office for evaluation of leukocytosis.  She has a history of anemia, currently on Ferrous Sulfate 325 mg PO QD, menorrhagia, depression, Bipolar disorder, cognitive deficits, low back pain, hyperlipidemia,  smokes 4 cigarettes a day, and morbid obesity.  She presented to her PCP for follow-up after her hospital admission on 10/7/2023 for weakness and vaginal bleeding x 3 weeks.  She was given 3 units of PRBCs and was seen by Dr. Gaucher GYN during her hospital admission.  Pelvic US on 10/8/2023 revealed:  Significant amount of complex fluid likely blood with debris distending the endometrial cavity.  Evaluation of CBCs: and CMPs:  11/1/2023:  WBC 12.1, Hgb 9.5, Hct 30.7, MCV 92, RDW 15.7, Plt 399,000, ANC 8.3 and CMP WNL.   CBC on 10/10/2023:  WBC 12.9, Hgb 11.3, hct 37.4, MCV 94, MCHC 30.2, RDW 16.3, Plts 455,000, ANC 9.8, CMP WNL.   CBC 05/06/2023:  CBC 10/7/2022:  WBC 7.3, Hgb 11.6, hct 37.9, MCV 91, MCHC 30.6, RDW 15.6, Plts 325,000, ANC 4.4, CMP WNL.    Interval history:    4/14/25:  Ms. Rios is here today for discussion of lab results regarding leukocytosis and iron deficiency anemia.  She reports feeling well today.  She denies any fever, chills, drenching night sweats, or weight loss.  She confirms dizziness, fatigue, cough, SOB, chest pain, confirms severe menorrhagia, denies any epistaxis, hemoptysis, hematochezia, melena, hematuria, no abdominal pain, confirms constipation, no change in bladder habits, no joint/bone/muscle or leg cramping, no lower extremity pain or swelling, and no neurological changes. She has not been compliant with oral iron, will refill today  She continues to follow-up with GYN for menorrhagia.  She  reports eating and drinking well.      Family history:  Father -  from lung cancer.      Social History     Socioeconomic History    Marital status: Single   Tobacco Use    Smoking status: Every Day     Current packs/day: 0.25     Types: Cigarettes    Smokeless tobacco: Never   Substance and Sexual Activity    Alcohol use: Never    Drug use: Never    Sexual activity: Yes       Current Outpatient Medications   Medication Sig Dispense Refill    atorvastatin (LIPITOR) 20 MG tablet Take 20 mg by mouth once daily.      calcium citrate-vitamin D3 315-200 mg (CITRACAL+D) 315 mg-5 mcg (200 unit) per tablet Take 1 tablet by mouth 2 (two) times daily.      docusate sodium (COLACE) 100 MG capsule Take 100 mg by mouth 2 (two) times daily as needed.      DULoxetine (CYMBALTA) 60 MG capsule Take 60 mg by mouth 2 (two) times daily.      ferrous sulfate (FEOSOL) 325 mg (65 mg iron) Tab tablet Take 1 tablet (325 mg total) by mouth once daily. 30 tablet 4    ibuprofen (ADVIL,MOTRIN) 200 MG tablet Take 600 mg by mouth every 6 (six) hours as needed for Pain.      methylcellulose, laxative, 500 mg Tab Take 1 tablet by mouth Daily.      metoprolol succinate (TOPROL-XL) 25 MG 24 hr tablet Take 25 mg by mouth once daily.      multivitamin-iron-folic acid Tab Take 1 tablet by mouth Daily.      polyethylene glycol (GLYCOLAX) 17 gram PwPk Take 17 g by mouth as needed.      QUEtiapine 150 mg Tab Take 150 mg by mouth Daily.      TRILEPTAL 150 mg Tab Take 150 mg by mouth once daily.      TRIVORA, 28, 50-30 (6)/75-40 (5)/125-30(10) per tablet Take 1 tablet by mouth once daily.       No current facility-administered medications for this visit.       Review of patient's allergies indicates:   Allergen Reactions    Lortab [hydrocodone-acetaminophen] Nausea Only     Labs:  25: cr 0.81, iron 85, iron sat 18%, ferritin 38, wbc 10.37, hgb 12.3, plt 407, ANC 7.18    25: cr 0.74, iron 92, iron sat 24%, ferritin 24, wbc 9.12, hgb 10.5, plt  399, ANC 5.61    10/9/24: cr 0.74, iron 113, iron sat 30%, ferritin 34, wbc 10.99, hgb 11.2, plt 397, ANC 7.68  06/03/24: cr 0.81, iron 60, iron sat 19%, ferritin 159, wbc 9.49, hgb 11.9, plt 388, ANC 6.91  03/15/2024:  Creatinine 0.68,  liver enzymes WNL, T. Bili WNL, iron saturation 8, ferritin 26, WBC 9.28, Hgb 11.2, Hct 35.6, Plt 435,000, Retic 1.80.  12/12/2023:  Creatinine 0.87,  Liver enzymes WNL, T. Bili WNL, iron saturation 6%, ferritin 27, WBC 11.54, Hgb 9.9, Nct 33.2, and Plt 489,000, Retic 3.80.     Review of systems  CONSTITUTIONAL: no fevers, no chills, no weight loss, no fatigue, no weakness  HEMATOLOGIC: no abnormal bleeding, no abnormal bruising, no drenching night sweats  ONCOLOGIC: no new masses or lumps  HEENT: no vision loss, no tinnitus or hearing loss, no nose bleeding, no dysphagia, no odynophagia  CVS: no chest pain, no palpitations, no dyspnea on exertion  RESP: no shortness of breath, no hemoptysis, no cough  GI: no nausea, no vomiting, no diarrhea, + constipation, no melena, no hematochezia, no hematemesis, no abdominal pain, no increase in abdominal girth  : no dysuria, no hematuria, no hesitancy, no discharge  INTEGUMENT: no rashes, no abnormal bruising, no nail pitting, no hyperpigmentation  NEURO: no falls, no memory loss, no paresthesias or dysesthesias, no urofecal incontinence or retention, no loss of strength on any extremity  MSK: no back pain, no new joint pain, no joint swelling  PSYCH: no suicidal or homicidal ideation, no depression, no insomnia, no anhedonia  ENDOCRINE: no heat or cold intolerance, no polyuria, no polydipsia  GYN:  Menorrhagia    Physical Exam:  Vitals:    07/28/25 1303   BP: 120/86   Pulse: 100   Resp: 12   Temp: 97.9 °F (36.6 °C)             ECOG PS 0  GA: AAOx3, NAD  HEENT: NCAT, PERRLA, EOMI, good dentition, moist oral mucous membranes  LYMPH: no cervical, axillary or supraclavicular adenopathy  CVS: s1s2 RRR, no M/R/G  RESP: CTA b/l, no crackles, no  wheezes or rhonchi  ABD: soft, NT, ND, BS+, no hepatosplenomegaly  EXT: no deformities, no pedal edema  SKIN: no rashes, warm and dry  NEURO: normal mentation, strength 5/5 on all 4 extremities, no sensory deficits        Assessment    Iron deficiency anemia secondary to menorrhagia D 50.0    Patient with history of severe menorrhagia and iron deficiency anemia.  Recently hospitalized for weakness and 3 weeks of heavy menstrual bleeding.  Pelvic US positive for blood and debris in the endometrial cavity.  Takes daily iron without side effects.  Has an appointment with Dr. Alicea, GYN in February.  WBC normal over the last several years.  We discussed sending her for labs today to include CBC with differential, CMP, and iron panel based on the history of severe menorrhagia and recent hospitalization for weakness and anemia.  We discussed possibly moving up her GYN appointment with Dr. Alicea from February to December or January.  We discussed continuing with oral iron daily on empty stomach with a source of Vitamin C, and based on iron panel today, we may need to replete her iron with IV iron infusion.   12/12/2023:  Iron saturation 6%, ferritin 27, WBC 11.54, Hgb 9.9, Hct 33.2, Retic 3.80.  Labs dated 03/15/2024:  Iron saturation 8, ferritin 26, WBC 9.28, Hgb 11.2, Hct 35.6, Retic 1.80    Leukocytosis reactive D72.828    She is probably experiencing reactive leukocytosis from heavy menstrual bleeding.  Resolved 3/15/2024.     Plan   Labs reviewed,  Iron and WBC levels stable  Continue follow-up with GYN for menorrhagia management.  RTC 3 months CMP, CBC, and iron panel.    Continue Ferrous Sulfate 325 mg PO QD on empty stomach with a good source of Vitamin C.      Odalis Sanz [id number]